# Patient Record
Sex: MALE | Race: WHITE | Employment: PART TIME | URBAN - METROPOLITAN AREA
[De-identification: names, ages, dates, MRNs, and addresses within clinical notes are randomized per-mention and may not be internally consistent; named-entity substitution may affect disease eponyms.]

---

## 2020-03-18 ENCOUNTER — APPOINTMENT (OUTPATIENT)
Dept: GENERAL RADIOLOGY | Age: 25
End: 2020-03-18
Payer: OTHER MISCELLANEOUS

## 2020-03-18 ENCOUNTER — APPOINTMENT (OUTPATIENT)
Dept: CT IMAGING | Age: 25
End: 2020-03-18
Payer: OTHER MISCELLANEOUS

## 2020-03-18 ENCOUNTER — HOSPITAL ENCOUNTER (EMERGENCY)
Age: 25
Discharge: HOME | End: 2020-03-18
Attending: EMERGENCY MEDICINE
Payer: OTHER MISCELLANEOUS

## 2020-03-18 VITALS
HEART RATE: 52 BPM | OXYGEN SATURATION: 99 % | SYSTOLIC BLOOD PRESSURE: 141 MMHG | RESPIRATION RATE: 16 BRPM | TEMPERATURE: 98.1 F | DIASTOLIC BLOOD PRESSURE: 70 MMHG

## 2020-03-18 LAB
ANION GAP SERPL CALCULATED.3IONS-SCNC: 10 MMOL/L (ref 3–16)
BASOPHILS ABSOLUTE: 0 K/UL (ref 0–0.2)
BASOPHILS RELATIVE PERCENT: 0.5 %
BUN BLDV-MCNC: 17 MG/DL (ref 7–20)
CALCIUM SERPL-MCNC: 9.5 MG/DL (ref 8.3–10.6)
CHLORIDE BLD-SCNC: 102 MMOL/L (ref 99–110)
CO2: 27 MMOL/L (ref 21–32)
CREAT SERPL-MCNC: 1 MG/DL (ref 0.9–1.3)
EOSINOPHILS ABSOLUTE: 0.1 K/UL (ref 0–0.6)
EOSINOPHILS RELATIVE PERCENT: 1.1 %
GFR AFRICAN AMERICAN: >60
GFR NON-AFRICAN AMERICAN: >60
GLUCOSE BLD-MCNC: 102 MG/DL (ref 70–99)
HCT VFR BLD CALC: 43 % (ref 40.5–52.5)
HEMOGLOBIN: 15.3 G/DL (ref 13.5–17.5)
INR BLD: 1.12 (ref 0.86–1.14)
LYMPHOCYTES ABSOLUTE: 1.4 K/UL (ref 1–5.1)
LYMPHOCYTES RELATIVE PERCENT: 29.4 %
MCH RBC QN AUTO: 31.2 PG (ref 26–34)
MCHC RBC AUTO-ENTMCNC: 35.5 G/DL (ref 31–36)
MCV RBC AUTO: 87.7 FL (ref 80–100)
MONOCYTES ABSOLUTE: 0.7 K/UL (ref 0–1.3)
MONOCYTES RELATIVE PERCENT: 14.2 %
NEUTROPHILS ABSOLUTE: 2.6 K/UL (ref 1.7–7.7)
NEUTROPHILS RELATIVE PERCENT: 54.8 %
PDW BLD-RTO: 12.6 % (ref 12.4–15.4)
PLATELET # BLD: 227 K/UL (ref 135–450)
PMV BLD AUTO: 7.5 FL (ref 5–10.5)
POTASSIUM REFLEX MAGNESIUM: 4 MMOL/L (ref 3.5–5.1)
PROTHROMBIN TIME: 13 SEC (ref 10–13.2)
RBC # BLD: 4.9 M/UL (ref 4.2–5.9)
SODIUM BLD-SCNC: 139 MMOL/L (ref 136–145)
WBC # BLD: 4.8 K/UL (ref 4–11)

## 2020-03-18 PROCEDURE — 6360000002 HC RX W HCPCS: Performed by: NURSE PRACTITIONER

## 2020-03-18 PROCEDURE — 73560 X-RAY EXAM OF KNEE 1 OR 2: CPT

## 2020-03-18 PROCEDURE — 99284 EMERGENCY DEPT VISIT MOD MDM: CPT

## 2020-03-18 PROCEDURE — 90715 TDAP VACCINE 7 YRS/> IM: CPT | Performed by: NURSE PRACTITIONER

## 2020-03-18 PROCEDURE — 85025 COMPLETE CBC W/AUTO DIFF WBC: CPT

## 2020-03-18 PROCEDURE — 70450 CT HEAD/BRAIN W/O DYE: CPT

## 2020-03-18 PROCEDURE — 80048 BASIC METABOLIC PNL TOTAL CA: CPT

## 2020-03-18 PROCEDURE — 2580000003 HC RX 258: Performed by: NURSE PRACTITIONER

## 2020-03-18 PROCEDURE — 6370000000 HC RX 637 (ALT 250 FOR IP): Performed by: NURSE PRACTITIONER

## 2020-03-18 PROCEDURE — 70498 CT ANGIOGRAPHY NECK: CPT

## 2020-03-18 PROCEDURE — 90471 IMMUNIZATION ADMIN: CPT | Performed by: NURSE PRACTITIONER

## 2020-03-18 PROCEDURE — 6360000004 HC RX CONTRAST MEDICATION: Performed by: EMERGENCY MEDICINE

## 2020-03-18 PROCEDURE — 96374 THER/PROPH/DIAG INJ IV PUSH: CPT

## 2020-03-18 PROCEDURE — 6360000002 HC RX W HCPCS: Performed by: EMERGENCY MEDICINE

## 2020-03-18 PROCEDURE — 85610 PROTHROMBIN TIME: CPT

## 2020-03-18 PROCEDURE — 71045 X-RAY EXAM CHEST 1 VIEW: CPT

## 2020-03-18 PROCEDURE — 73130 X-RAY EXAM OF HAND: CPT

## 2020-03-18 RX ORDER — NAPROXEN 500 MG/1
500 TABLET ORAL 2 TIMES DAILY
Qty: 20 TABLET | Refills: 0 | Status: SHIPPED | OUTPATIENT
Start: 2020-03-18 | End: 2021-11-17

## 2020-03-18 RX ORDER — CYCLOBENZAPRINE HCL 10 MG
10 TABLET ORAL ONCE
Status: COMPLETED | OUTPATIENT
Start: 2020-03-18 | End: 2020-03-18

## 2020-03-18 RX ORDER — CYCLOBENZAPRINE HCL 10 MG
10 TABLET ORAL 3 TIMES DAILY PRN
Qty: 21 TABLET | Refills: 0 | Status: SHIPPED | OUTPATIENT
Start: 2020-03-18 | End: 2020-03-28

## 2020-03-18 RX ORDER — KETOROLAC TROMETHAMINE 30 MG/ML
30 INJECTION, SOLUTION INTRAMUSCULAR; INTRAVENOUS
Status: COMPLETED | OUTPATIENT
Start: 2020-03-18 | End: 2020-03-18

## 2020-03-18 RX ORDER — 0.9 % SODIUM CHLORIDE 0.9 %
1000 INTRAVENOUS SOLUTION INTRAVENOUS ONCE
Status: COMPLETED | OUTPATIENT
Start: 2020-03-18 | End: 2020-03-18

## 2020-03-18 RX ORDER — NAPROXEN 250 MG/1
500 TABLET ORAL ONCE
Status: COMPLETED | OUTPATIENT
Start: 2020-03-18 | End: 2020-03-18

## 2020-03-18 RX ADMIN — KETOROLAC TROMETHAMINE 30 MG: 30 INJECTION, SOLUTION INTRAMUSCULAR at 14:22

## 2020-03-18 RX ADMIN — SODIUM CHLORIDE 1000 ML: 9 INJECTION, SOLUTION INTRAVENOUS at 14:25

## 2020-03-18 RX ADMIN — TETANUS TOXOID, REDUCED DIPHTHERIA TOXOID AND ACELLULAR PERTUSSIS VACCINE, ADSORBED 0.5 ML: 5; 2.5; 8; 8; 2.5 SUSPENSION INTRAMUSCULAR at 14:23

## 2020-03-18 RX ADMIN — CYCLOBENZAPRINE HYDROCHLORIDE 10 MG: 10 TABLET, FILM COATED ORAL at 14:25

## 2020-03-18 RX ADMIN — IOPAMIDOL 75 ML: 755 INJECTION, SOLUTION INTRAVENOUS at 15:34

## 2020-03-18 RX ADMIN — NAPROXEN 500 MG: 250 TABLET ORAL at 14:23

## 2020-03-18 ASSESSMENT — PAIN DESCRIPTION - ORIENTATION: ORIENTATION: RIGHT

## 2020-03-18 ASSESSMENT — PAIN DESCRIPTION - LOCATION: LOCATION: KNEE;SHOULDER

## 2020-03-18 ASSESSMENT — PAIN DESCRIPTION - PAIN TYPE: TYPE: ACUTE PAIN

## 2020-03-18 ASSESSMENT — PAIN SCALES - GENERAL
PAINLEVEL_OUTOF10: 8
PAINLEVEL_OUTOF10: 4
PAINLEVEL_OUTOF10: 7
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
## 2021-11-15 ENCOUNTER — OFFICE VISIT (OUTPATIENT)
Dept: INTERNAL MEDICINE CLINIC | Age: 26
End: 2021-11-15
Payer: COMMERCIAL

## 2021-11-15 VITALS
DIASTOLIC BLOOD PRESSURE: 84 MMHG | OXYGEN SATURATION: 96 % | HEART RATE: 92 BPM | BODY MASS INDEX: 24.46 KG/M2 | SYSTOLIC BLOOD PRESSURE: 134 MMHG | WEIGHT: 161.4 LBS | HEIGHT: 68 IN

## 2021-11-15 DIAGNOSIS — Z11.4 SCREENING FOR HIV (HUMAN IMMUNODEFICIENCY VIRUS): ICD-10-CM

## 2021-11-15 DIAGNOSIS — F17.200 VAPING NICOTINE DEPENDENCE, NON-TOBACCO PRODUCT: ICD-10-CM

## 2021-11-15 DIAGNOSIS — R22.1 LOCALIZED SWELLING, MASS AND LUMP, NECK: ICD-10-CM

## 2021-11-15 DIAGNOSIS — Z00.01 ENCOUNTER FOR WELL ADULT EXAM WITH ABNORMAL FINDINGS: Primary | ICD-10-CM

## 2021-11-15 DIAGNOSIS — M25.521 RIGHT ELBOW PAIN: ICD-10-CM

## 2021-11-15 DIAGNOSIS — Z11.59 NEED FOR HEPATITIS C SCREENING TEST: ICD-10-CM

## 2021-11-15 DIAGNOSIS — Z78.9 VARICELLA VACCINATION STATUS UNKNOWN: ICD-10-CM

## 2021-11-15 LAB
A/G RATIO: 2.2 (ref 1.1–2.2)
ALBUMIN SERPL-MCNC: 5 G/DL (ref 3.4–5)
ALP BLD-CCNC: 60 U/L (ref 40–129)
ALT SERPL-CCNC: 54 U/L (ref 10–40)
ANION GAP SERPL CALCULATED.3IONS-SCNC: 16 MMOL/L (ref 3–16)
AST SERPL-CCNC: 35 U/L (ref 15–37)
BASOPHILS ABSOLUTE: 0 K/UL (ref 0–0.2)
BASOPHILS RELATIVE PERCENT: 0.9 %
BILIRUB SERPL-MCNC: 0.8 MG/DL (ref 0–1)
BILIRUBIN DIRECT: <0.2 MG/DL (ref 0–0.3)
BILIRUBIN, INDIRECT: ABNORMAL MG/DL (ref 0–1)
BUN BLDV-MCNC: 23 MG/DL (ref 7–20)
CALCIUM SERPL-MCNC: 9.3 MG/DL (ref 8.3–10.6)
CHLORIDE BLD-SCNC: 104 MMOL/L (ref 99–110)
CHOLESTEROL, TOTAL: 152 MG/DL (ref 0–199)
CO2: 24 MMOL/L (ref 21–32)
CREAT SERPL-MCNC: 1.1 MG/DL (ref 0.9–1.3)
EOSINOPHILS ABSOLUTE: 0 K/UL (ref 0–0.6)
EOSINOPHILS RELATIVE PERCENT: 1.4 %
GFR AFRICAN AMERICAN: >60
GFR NON-AFRICAN AMERICAN: >60
GLUCOSE BLD-MCNC: 81 MG/DL (ref 70–99)
HCT VFR BLD CALC: 41.2 % (ref 40.5–52.5)
HDLC SERPL-MCNC: 72 MG/DL (ref 40–60)
HEMOGLOBIN: 14.2 G/DL (ref 13.5–17.5)
HEPATITIS C ANTIBODY INTERPRETATION: NORMAL
LDL CHOLESTEROL CALCULATED: 67 MG/DL
LYMPHOCYTES ABSOLUTE: 1.1 K/UL (ref 1–5.1)
LYMPHOCYTES RELATIVE PERCENT: 34.9 %
MCH RBC QN AUTO: 31.3 PG (ref 26–34)
MCHC RBC AUTO-ENTMCNC: 34.5 G/DL (ref 31–36)
MCV RBC AUTO: 90.6 FL (ref 80–100)
MONOCYTES ABSOLUTE: 0.5 K/UL (ref 0–1.3)
MONOCYTES RELATIVE PERCENT: 15.4 %
NEUTROPHILS ABSOLUTE: 1.5 K/UL (ref 1.7–7.7)
NEUTROPHILS RELATIVE PERCENT: 47.4 %
PDW BLD-RTO: 12.9 % (ref 12.4–15.4)
PLATELET # BLD: 237 K/UL (ref 135–450)
PMV BLD AUTO: 8.2 FL (ref 5–10.5)
POTASSIUM SERPL-SCNC: 4.2 MMOL/L (ref 3.5–5.1)
RBC # BLD: 4.55 M/UL (ref 4.2–5.9)
SODIUM BLD-SCNC: 144 MMOL/L (ref 136–145)
TOTAL PROTEIN: 7.3 G/DL (ref 6.4–8.2)
TRIGL SERPL-MCNC: 64 MG/DL (ref 0–150)
TSH REFLEX: 0.71 UIU/ML (ref 0.27–4.2)
VLDLC SERPL CALC-MCNC: 13 MG/DL
WBC # BLD: 3.1 K/UL (ref 4–11)

## 2021-11-15 PROCEDURE — G8427 DOCREV CUR MEDS BY ELIG CLIN: HCPCS | Performed by: INTERNAL MEDICINE

## 2021-11-15 PROCEDURE — 1036F TOBACCO NON-USER: CPT | Performed by: INTERNAL MEDICINE

## 2021-11-15 PROCEDURE — 99204 OFFICE O/P NEW MOD 45 MIN: CPT | Performed by: INTERNAL MEDICINE

## 2021-11-15 PROCEDURE — G8484 FLU IMMUNIZE NO ADMIN: HCPCS | Performed by: INTERNAL MEDICINE

## 2021-11-15 PROCEDURE — 99385 PREV VISIT NEW AGE 18-39: CPT | Performed by: INTERNAL MEDICINE

## 2021-11-15 PROCEDURE — 36415 COLL VENOUS BLD VENIPUNCTURE: CPT | Performed by: INTERNAL MEDICINE

## 2021-11-15 PROCEDURE — G8420 CALC BMI NORM PARAMETERS: HCPCS | Performed by: INTERNAL MEDICINE

## 2021-11-15 RX ORDER — NICOTINE 21 MG/24HR
1 PATCH, TRANSDERMAL 24 HOURS TRANSDERMAL DAILY
Qty: 14 PATCH | Refills: 2 | Status: SHIPPED | OUTPATIENT
Start: 2021-11-15 | End: 2022-07-12

## 2021-11-15 SDOH — ECONOMIC STABILITY: FOOD INSECURITY: WITHIN THE PAST 12 MONTHS, YOU WORRIED THAT YOUR FOOD WOULD RUN OUT BEFORE YOU GOT MONEY TO BUY MORE.: NEVER TRUE

## 2021-11-15 SDOH — ECONOMIC STABILITY: FOOD INSECURITY: WITHIN THE PAST 12 MONTHS, THE FOOD YOU BOUGHT JUST DIDN'T LAST AND YOU DIDN'T HAVE MONEY TO GET MORE.: NEVER TRUE

## 2021-11-15 ASSESSMENT — PATIENT HEALTH QUESTIONNAIRE - PHQ9
1. LITTLE INTEREST OR PLEASURE IN DOING THINGS: 0
SUM OF ALL RESPONSES TO PHQ9 QUESTIONS 1 & 2: 0
SUM OF ALL RESPONSES TO PHQ QUESTIONS 1-9: 0
SUM OF ALL RESPONSES TO PHQ QUESTIONS 1-9: 0
2. FEELING DOWN, DEPRESSED OR HOPELESS: 0
SUM OF ALL RESPONSES TO PHQ QUESTIONS 1-9: 0

## 2021-11-15 ASSESSMENT — ENCOUNTER SYMPTOMS
NAUSEA: 0
SORE THROAT: 0
VOMITING: 0
COUGH: 0
BLOOD IN STOOL: 0
ABDOMINAL PAIN: 0
SHORTNESS OF BREATH: 0

## 2021-11-15 ASSESSMENT — SOCIAL DETERMINANTS OF HEALTH (SDOH): HOW HARD IS IT FOR YOU TO PAY FOR THE VERY BASICS LIKE FOOD, HOUSING, MEDICAL CARE, AND HEATING?: NOT HARD AT ALL

## 2021-11-15 NOTE — PROGRESS NOTES
11/15/2021    Edgar Middleton (:  1995) is a 27 y.o. male, here for a preventive medicine evaluation. Patient Active Problem List   Diagnosis    Right elbow pain    Vaping nicotine dependence, non-tobacco product       Review of Systems   Constitutional: Negative for fatigue and fever. HENT: Negative for nosebleeds and sore throat. Cervical lymphadenopathy   Respiratory: Negative for cough and shortness of breath. Cardiovascular: Negative for chest pain, palpitations and leg swelling. Gastrointestinal: Negative for abdominal pain, blood in stool, nausea and vomiting. Musculoskeletal:        R elbow pain   Neurological: Negative for dizziness and weakness. Prior to Visit Medications    Medication Sig Taking? Authorizing Provider   naproxen (NAPROSYN) 500 MG tablet Take 1 tablet by mouth 2 times daily for 20 doses  Patient not taking: Reported on 11/15/2021  VONNIE Smith - CNP        No Known Allergies    History reviewed. No pertinent past medical history. History reviewed. No pertinent surgical history.     Social History     Socioeconomic History    Marital status: Single     Spouse name: Not on file    Number of children: Not on file    Years of education: Not on file    Highest education level: Not on file   Occupational History    Not on file   Tobacco Use    Smoking status: Never Smoker    Smokeless tobacco: Never Used   Vaping Use    Vaping Use: Every day    Substances: Always   Substance and Sexual Activity    Alcohol use: Yes     Comment: weekly     Drug use: No    Sexual activity: Not on file   Other Topics Concern    Not on file   Social History Narrative    Not on file     Social Determinants of Health     Financial Resource Strain: Low Risk     Difficulty of Paying Living Expenses: Not hard at all   Food Insecurity: No Food Insecurity    Worried About [address] in the Last Year: Never true    [address] in the Last Year: Never true   Transportation Needs:     Lack of Transportation (Medical): Not on file    Lack of Transportation (Non-Medical): Not on file   Physical Activity:     Days of Exercise per Week: Not on file    Minutes of Exercise per Session: Not on file   Stress:     Feeling of Stress : Not on file   Social Connections:     Frequency of Communication with Friends and Family: Not on file    Frequency of Social Gatherings with Friends and Family: Not on file    Attends Religion Services: Not on file    Active Member of [address] [place] [address] or Organizations: Not on file    Attends Club or Organization Meetings: Not on file    Marital Status: Not on file   Intimate Partner Violence:     Fear of Current or Ex-Partner: Not on file    Emotionally Abused: Not on file    Physically Abused: Not on file    Sexually Abused: Not on file   Housing Stability:     Unable to Pay for Housing in the Last Year: Not on file    Number of [place] in the Last Year: Not on file    Unstable Housing in the Last Year: Not on file        History reviewed. No pertinent family history. ADVANCE DIRECTIVE: N, <no information>    Vitals:    11/15/21 1103   BP: 134/84   Pulse: 92   SpO2: 96%   Weight: 161 lb 6.4 oz (73.2 kg)   Height: 5' 8\" (1.727 m)     Estimated body mass index is 24.54 kg/m² as calculated from the following:    Height as of this encounter: 5' 8\" (1.727 m). Weight as of this encounter: 161 lb 6.4 oz (73.2 kg). Physical Exam  Constitutional:       Appearance: Normal appearance. HENT:      Head: Normocephalic and atraumatic. Eyes:      General: No scleral icterus. Conjunctiva/sclera: Conjunctivae normal.   Cardiovascular:      Rate and Rhythm: Normal rate and regular rhythm. Pulses: Normal pulses. Heart sounds: Normal heart sounds. Pulmonary:      Effort: Pulmonary effort is normal.      Breath sounds: Normal breath sounds. Musculoskeletal:         General: No swelling.    Skin:     General: Skin is warm and dry. Neurological:      Mental Status: He is alert and oriented to person, place, and time. Mental status is at baseline. Psychiatric:         Mood and Affect: Mood normal.         Behavior: Behavior normal.         No flowsheet data found. Lab Results   Component Value Date    GLUCOSE 102 03/18/2020       The ASCVD Risk score (Usha Luong et al., 2013) failed to calculate for the following reasons: The 2013 ASCVD risk score is only valid for ages 36 to 78    Immunization History   Administered Date(s) Administered    Tdap (Boostrix, Adacel) 03/18/2020       Health Maintenance   Topic Date Due    Hepatitis C screen  Never done    Varicella vaccine (1 of 2 - 2-dose childhood series) Never done    HPV vaccine (1 - Male 2-dose series) Never done    COVID-19 Vaccine (1) Never done    HIV screen  Never done    Flu vaccine (1) Never done    DTaP/Tdap/Td vaccine (2 - Td or Tdap) 03/18/2030    Hepatitis A vaccine  Aged Out    Hepatitis B vaccine  Aged Out    Hib vaccine  Aged Out    Meningococcal (ACWY) vaccine  Aged Out    Pneumococcal 0-64 years Vaccine  Aged Out          ASSESSMENT/PLAN:  1. Encounter for well adult exam with abnormal findings  -     CBC Auto Differential; Future  -     Comprehensive Metabolic Panel; Future  -     Hepatic Function Panel; Future  -     Hemoglobin A1C; Future  -     Hepatitis C Antibody; Future  -     Lipid Panel; Future  -     TSH with Reflex; Future  -     HIV Screen; Future  2. Need for hepatitis C screening test  -     Hepatitis C Antibody; Future  3. Screening for HIV (human immunodeficiency virus)  -     HIV Screen; Future  4. Varicella vaccination status unknown  -     VARICELLA ZOSTER ANTIBODY, IGG; Future  5. Right elbow pain  6. Vaping nicotine dependence, non-tobacco product      Return in about 4 weeks (around 12/13/2021). An electronic signature was used to authenticate this note.     --Carmen Rogers MD on 11/15/2021 at 11:22 AM

## 2021-11-15 NOTE — PROGRESS NOTES
Laverne Marmolejo (:  1995) is a 27 y.o. male, New patient, here for evaluation of the following chief complaint(s):  Joint Pain (cryotherapy and iced. has started hurting after switching up workout ), Established New Doctor, and Annual Exam         ASSESSMENT/PLAN:  1. Right elbow pain  Probably medial epicondylitits  -     Caryle Saltness MD, Orthopedic Surgery, [facility]  2. Vaping nicotine dependence, non-tobacco product  -     nicotine (NICODERM CQ) 14 MG/24HR; Place 1 patch onto the skin daily for 14 days, Disp-14 patch, R-2Normal  -     nicotine polacrilex (NICORETTE) 2 MG gum; Take 1 each by mouth every 2 hours as needed for Smoking cessation, Disp-110 each, R-3Normal  3. Localized swelling, mass and lump, neck  -     CT SOFT TISSUE NECK W CONTRAST; Future      Return in about 4 weeks (around 2021). Subjective   SUBJECTIVE/OBJECTIVE:  Vaping:  Vapes 150 puffs/day for past 4 years  Willing to quit    Neck swelling:  Chronic, small lumps present over multiple sites in the neck. Non tender. Hand Pain   The incident occurred more than 1 week ago. The incident occurred at the gym. The injury mechanism was repetitive motion. The pain is present in the right elbow. The quality of the pain is described as aching. The pain does not radiate. The pain is at a severity of 6/10. The pain is moderate. The pain has been fluctuating since the incident. Pertinent negatives include no chest pain, muscle weakness, numbness or tingling. The symptoms are aggravated by movement. He has tried ice and rest for the symptoms. The treatment provided no relief. Review of Systems   Constitutional: Negative for fatigue and fever. HENT: Negative for nosebleeds and sore throat. Neck swelling   Respiratory: Negative for cough and shortness of breath. Cardiovascular: Negative for chest pain, palpitations and leg swelling.    Gastrointestinal: Negative for abdominal pain, blood in stool, nausea and vomiting. Musculoskeletal:        R elbow pian   Neurological: Negative for dizziness, tingling, weakness and numbness. Objective   Physical Exam  Constitutional:       Appearance: Normal appearance. HENT:      Head: Normocephalic and atraumatic. Eyes:      General: No scleral icterus. Conjunctiva/sclera: Conjunctivae normal.   Cardiovascular:      Rate and Rhythm: Normal rate and regular rhythm. Pulses: Normal pulses. Heart sounds: Normal heart sounds. Pulmonary:      Effort: Pulmonary effort is normal.      Breath sounds: Normal breath sounds. Musculoskeletal:         General: No swelling. Right elbow: No swelling, deformity, effusion or lacerations. Decreased range of motion. Tenderness present in medial epicondyle. Cervical back: Full passive range of motion without pain and normal range of motion. Lymphadenopathy:      Cervical: Cervical adenopathy present. Skin:     General: Skin is warm and dry. Neurological:      Mental Status: He is alert and oriented to person, place, and time. Mental status is at baseline. Psychiatric:         Mood and Affect: Mood normal.         Behavior: Behavior normal.              An electronic signature was used to authenticate this note.     --Juli Swan MD

## 2021-11-16 LAB
ESTIMATED AVERAGE GLUCOSE: 91.1 MG/DL
HBA1C MFR BLD: 4.8 %
HIV AG/AB: NORMAL
HIV ANTIGEN: NORMAL
HIV-1 ANTIBODY: NORMAL
HIV-2 AB: NORMAL
VARICELLA-ZOSTER VIRUS AB, IGG: NORMAL

## 2021-11-17 ENCOUNTER — OFFICE VISIT (OUTPATIENT)
Dept: ORTHOPEDIC SURGERY | Age: 26
End: 2021-11-17
Payer: COMMERCIAL

## 2021-11-17 VITALS — BODY MASS INDEX: 24.4 KG/M2 | HEIGHT: 68 IN | WEIGHT: 161 LBS

## 2021-11-17 DIAGNOSIS — M77.01 MEDIAL EPICONDYLITIS OF RIGHT ELBOW: ICD-10-CM

## 2021-11-17 DIAGNOSIS — M25.521 RIGHT ELBOW PAIN: Primary | ICD-10-CM

## 2021-11-17 PROCEDURE — G8427 DOCREV CUR MEDS BY ELIG CLIN: HCPCS | Performed by: FAMILY MEDICINE

## 2021-11-17 PROCEDURE — G8484 FLU IMMUNIZE NO ADMIN: HCPCS | Performed by: FAMILY MEDICINE

## 2021-11-17 PROCEDURE — G8420 CALC BMI NORM PARAMETERS: HCPCS | Performed by: FAMILY MEDICINE

## 2021-11-17 PROCEDURE — 99203 OFFICE O/P NEW LOW 30 MIN: CPT | Performed by: FAMILY MEDICINE

## 2021-11-17 PROCEDURE — MISCD86 TENNIS ELBOW STRAP-BREG: Performed by: FAMILY MEDICINE

## 2021-11-17 RX ORDER — MELOXICAM 15 MG/1
15 TABLET ORAL DAILY
Qty: 30 TABLET | Refills: 3 | Status: SHIPPED | OUTPATIENT
Start: 2021-11-17 | End: 2022-07-12

## 2021-11-17 RX ORDER — METHYLPREDNISOLONE 4 MG/1
TABLET ORAL
Qty: 21 KIT | Refills: 0 | Status: SHIPPED | OUTPATIENT
Start: 2021-11-17 | End: 2022-07-12 | Stop reason: ALTCHOICE

## 2021-11-17 RX ORDER — DEXAMETHASONE SODIUM PHOSPHATE 4 MG/ML
INJECTION, SOLUTION INTRA-ARTICULAR; INTRALESIONAL; INTRAMUSCULAR; INTRAVENOUS; SOFT TISSUE
Qty: 10 ML | Refills: 0 | Status: SHIPPED | OUTPATIENT
Start: 2021-11-17 | End: 2022-07-12

## 2021-11-17 NOTE — PATIENT INSTRUCTIONS
Take Medrol first for 6 days. This is a steroid pack. Flip the package over to the foil side and the directions will tell you to start with 6 pills the first day, 5 pills the second day, etc. Please do not take any other anti-inflammatories with the medrol dose juanita as this can upset your stomach. If something else is needed, you may take extra strength tylenol.      Once you are finished with the medrol, then you may start your anti-inflammatory: MELOXICAM 1X/DAY    DEXAMETHASONE - TAKE TO HAND THERAPY

## 2021-11-18 NOTE — PROGRESS NOTES
Chief Complaint    Elbow Pain (N RIGHT ELBOW)    Initial consultation right medial elbow pain with difficulty with gripping and grasping    History of Present Illness:  Anna Lehman is a 27 y.o. 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 Harpal Najera for evaluation of ongoing pain to the medial aspect of his right elbow. He states that he has been having ongoing pain symptoms since roughly July 2021 of pain to the medial aspect of his elbow. During his workouts he was incorporating more pull-ups and aggressive lifting of his upper extremities but there is no traumatic history of actual injury prior to becoming symptomatic and the onset of his symptoms were gradual.  He has no previous history of elbow pain and denies distal neuritic symptoms. He localizes the majority of his pain over the medial epicondyle which does worsen with resisted pronation and flexion. Gripping and grasping does produce pain in the range of 4-6 out of 10. He has tried icing and some low-dose anti-inflammatories without improvement of his symptoms and did see Dr. Cristino Bergman in the office on 11/15/2021 who referred him to us for orthopedic and sports consultation. He does live in [address] of the [place] location was more convenient for him. Denies locking or catching. He is not complaining of distal neuritic symptoms or night pain. He is being seen today for orthopedic and sports consultation with initial imaging. Medical History    Patient's medications, allergies, past medical, surgical, social and family histories were reviewed and updated as appropriate.       Review of Systems  Pertinent items are noted in HPI  Review of systems reviewed from Patient History Form dated on 11/17/2021 and available in the patient's chart under the Media tab. Vital Signs     Ht 5' 8\" (1.727 m)   Wt 161 lb (73 kg)   BMI 24.48 kg/m²     General Exam:     Constitutional: Patient is adequately groomed with no evidence of malnutrition  DTRs: Deep tendon reflexes are intact  Mental Status: The patient is oriented to time, place and person. The patient's mood and affect are appropriate. Lymphatic: The lymphatic examination bilaterally reveals all areas to be without enlargement or induration. Vascular: Examination reveals no swelling or calf tenderness. Peripheral pulses are palpable and 2+. Neurological: The patient has good coordination. There is no weakness or sensory deficit. Elbow Examination    Inspection: There is no high-grade deformity or substantial soft tissue swelling. No evidence of ecchymosis. Palpation: He does have clinical tenderness at 6-7 out of 10 with palpation of the medial epicondyle and proximal aspect of the common flexor tendon. No lateral radial head tenderness. Rang of Motion: He does have full active and passive range of motion about the elbow with some medial level pain with extreme supination. Strength: Pain associated weakness 4 out of 5 resisted wrist flexion and pronation. Special Tests: No evidence of elbow instability. Negative Tinel's cubital and carpal tunnel. Skin: There are no rashes, ulcerations or lesions. Distal motor sensory and vascular exam is intact. Gait: Fluid smooth gait       Reflex symmetrically preserved      Additional Comments:             Additional Examinations:     Contralateral Exam: Examination of the left elbow reveals warm, dry skin. Range of motion of the elbow and forearm is within normal limits. There is no tenderness of the medial or lateral epicondyles, olecranon, or radial head. Elbow and forearm strength is 5/5.       Right Upper Extremity:  Examination of the right upper extremity does not show any tenderness, deformity or injury. Range of motion is unremarkable. There is no gross instability. There are no rashes, ulcerations or lesions. Strength and tone are normal.  Left Upper Extremity: Examination of the left upper extremity does not show any tenderness, deformity or injury. Range of motion is unremarkable. There is no gross instability. There are no rashes, ulcerations or lesions. Strength and tone are normal.        Diagnostic Test Findings:   Right elbow AP lateral radial head films were obtained today does not show any evidence of osseous injury or degenerative changes. No medial epicondylar calcification noted. Assessment: #1.  4-month status post unrehabilitated suspected right medial epicondylitis    Impression:     Encounter Diagnoses   Name Primary?  Right elbow pain Yes    Medial epicondylitis of right elbow        Office Procedures:     Orders Placed This Encounter   Procedures    Breg Tennis Elbow Strap $20     Patient was supplied a Breg Tennis Elbow Strap. This retail item was supplied to provide functional support and assist in protecting the affected area. Verbal and written instructions for the use of and application of this item were provided. The patient was educated and fit by a healthcare professional with expert knowledge and specialization in brace application. They were instructed to contact the office immediately should the equipment result in increased pain, decreased sensation, increased swelling or worsening of the condition.     XR ELBOW RIGHT (MIN 3 VIEWS)     Standing Status:   Future     Number of Occurrences:   1     Standing Expiration Date:   11/17/2022     Order Specific Question:   Reason for exam:     Answer:   pain    Ambulatory referral to Occupational Therapy     Referral Priority:   Routine     Referral Type:   Eval and Treat     Referral Reason:   Specialty Services Required     Number of Visits Requested:   1       Treatment Plan: Treatment options were discussed withEdgar Lei. We did review his plain films and exam findings. He has been having ongoing symptoms for the past 4 months most consistent with unrehabilitated right elbow medial epicondylitis. We discussed various treatment options and ultimately opted to place him on a Medrol Dosepak to be followed by meloxicam 15 mg daily. He was placed in an elbow counterforce brace and will start him in a short course of hand therapy to include manual techniques including iontophoresis. Icing and activity modification modification of his workouts was discussed based on pain. We will see him back in a few weeks for follow-up. May consider epicondylar injection under imaging depending on his response to treatment. He will contact us in the interim with questions or concerns. CC: Dr. Lizabeth Pichardo    This dictation was performed with a verbal recognition program [facility] ) and it was checked for errors. It is possible that there are still dictated errors within this office note. If so, please bring any errors to my attention for an addendum. All efforts were made to ensure that this office note is accurate.

## 2021-11-22 ENCOUNTER — HOSPITAL ENCOUNTER (OUTPATIENT)
Dept: OCCUPATIONAL THERAPY | Age: 26
Setting detail: THERAPIES SERIES
Discharge: HOME | End: 2021-11-22

## 2021-12-15 ENCOUNTER — CLINICAL DOCUMENTATION (OUTPATIENT)
Dept: OTHER | Age: 26
End: 2021-12-15

## 2021-12-16 ENCOUNTER — TELEPHONE (OUTPATIENT)
Dept: ORTHOPEDIC SURGERY | Age: 26
End: 2021-12-16

## 2021-12-16 NOTE — TELEPHONE ENCOUNTER
General Question     Subject: WOULD LIKE A DIFFERENT ALTERNATIVE TO THE TOPICAL CREAM. PLEASE ADVISE.    PatienT: Paramjit Newby  Contact Number: [phone]

## 2021-12-16 NOTE — TELEPHONE ENCOUNTER
CLARIFIED WITH PATIENT ABOUT DEXAMETHASONE AND THAT ITS OK TO HOLD OFF (ON BACK ORDER AT PHARMACY), THAT SCHEDULING FOR THERAPY IS MOST IMPORTANT AT THIS POINT TO TREAT HIS ELBOW. PATIENT IS GOING TO CALL AND SCHEDULE.

## 2022-01-03 ENCOUNTER — OFFICE VISIT (OUTPATIENT)
Dept: ORTHOPEDIC SURGERY | Age: 27
End: 2022-01-03
Payer: COMMERCIAL

## 2022-01-03 DIAGNOSIS — M77.01 MEDIAL EPICONDYLITIS OF RIGHT ELBOW: ICD-10-CM

## 2022-01-03 DIAGNOSIS — S76.011A STRAIN OF FLEXOR MUSCLE OF RIGHT HIP, INITIAL ENCOUNTER: ICD-10-CM

## 2022-01-03 DIAGNOSIS — M25.551 RIGHT HIP PAIN: Primary | ICD-10-CM

## 2022-01-03 DIAGNOSIS — M25.521 RIGHT ELBOW PAIN: ICD-10-CM

## 2022-01-03 PROCEDURE — G8428 CUR MEDS NOT DOCUMENT: HCPCS | Performed by: FAMILY MEDICINE

## 2022-01-03 PROCEDURE — G8420 CALC BMI NORM PARAMETERS: HCPCS | Performed by: FAMILY MEDICINE

## 2022-01-03 PROCEDURE — G8484 FLU IMMUNIZE NO ADMIN: HCPCS | Performed by: FAMILY MEDICINE

## 2022-01-03 PROCEDURE — 99214 OFFICE O/P EST MOD 30 MIN: CPT | Performed by: FAMILY MEDICINE

## 2022-01-03 PROCEDURE — 1036F TOBACCO NON-USER: CPT | Performed by: FAMILY MEDICINE

## 2022-01-03 NOTE — PROGRESS NOTES
Chief Complaint    Elbow Pain (1 MO FU GOLFERS ELBOW) and Hip Pain (N R HIP)      History of Present Illness:  Марина Rebolledo is a 27 y.o. male presents to the office to follow-up on his right golfers elbow with new complaints of right hip flexor pain. Patient stated that his elbow has been feeling much better with the brace and with the medications. He completed a series of medrol dosepack and been taking meloxicam 15mg daily. Patient stated that his elbow pain and strength have improved about 95%. Patient denies any pain on his elbow today or weakness. Patient stated that in the last 2-3 weeks, he has started [address] [employer] training and has changed his exercise routine at the gym. He has been doing more exercises with hip abduction exercises. Patient stated that he has been experiencing mild hip flexor pain with abduction. The pain is in his groin area. He described the pain as tightness and sharp. Patient said that he feels like he needs stretching. He has been taking meloxicam which has been helping with the pain. Aggravating factors are hip abduction. Patient rated the pain as 1-2/10. Patient denies any injuries or trauma. Medical History  Patient's medications, allergies, past medical, surgical, social and family histories were reviewed and updated as appropriate. Review of Systems  Pertinent items are noted in HPI  Review of systems reviewed from Patient History Form dated on 1/3/2022   and available in the patient's chart under the Media tab. Vital Signs  There were no vitals filed for this visit. General Exam:     Constitutional: Patient is adequately groomed with no evidence of malnutrition  DTRs: Deep tendon reflexes are intact  Mental Status: The patient is oriented to time, place and person. The patient's mood and affect are appropriate. Lymphatic: The lymphatic examination bilaterally reveals all areas to be without enlargement or induration.   Vascular: Examination reveals no swelling or calf tenderness. Peripheral pulses are palpable and 2+. Neurological: The patient has good coordination. There is no weakness or sensory deficit. Hip Examination    Inspection:  No redness, rashes, bruises or lesions. Palpation:  nontender to touch    Rang of Motion: active and passive range of motion intact. Strength: 5 /5 in bilateral lower extremities with the exception of 4+ out of 5 strength with hip flexion on the right. Alessandra Lever Special Tests:   Negative log roll negative bilaterally. Some pain with passive extension of the right hip. Some tightness to his hamstrings and IT bands. No evidence of hip instability. Negative screening cervical testing. Skin: There are no rashes, ulcerations or lesions. Gait: normal    Reflex symmetrically preserved    Additional Comments: Right elbow examination reveals much less tenderness over the medial epicondyle full active and passive range of motion. He is no longer demonstrating substantial pain with resisted wrist flexion and forearm pronation. No evidence of elbow instability. No swelling or effusion. Additional Examinations:  Right Lower Extremity: Examination of the right lower extremity does not show any tenderness, deformity or injury. Range of motion is unremarkable. There is no gross instability. There are no rashes, ulcerations or lesions. Strength and tone are normal.  Left Lower Extremity: Examination of the left lower extremity does not show any tenderness, deformity or injury. Range of motion is unremarkable. There is no gross instability. There are no rashes, ulcerations or lesions. Strength and tone are normal.  Lower Back: Examination of the lower back does not show any tenderness, deformity or injury. Range of motion is unremarkable. There is no gross instability. There are no rashes, ulcerations or lesions.   Strength and tone are normal.      Diagnostic Test Findings:    XRAY right hip AP and frog films obtained 1/3/2021: unremarkable,  No signs or evidence of arthritis       Assessment :    Chart/records reviewed, history taken, physical exam performed, presenting problems addressed. Hip pain is most likely due to strain of flexor muscle. Impression:  Encounter Diagnoses   Name Primary?  Right hip pain Yes    Strain of flexor muscle of right hip, initial encounter        Office Procedures:  Orders Placed This Encounter   Procedures    XR HIP RIGHT (2-3 VIEWS)     Standing Status:   Future     Number of Occurrences:   1     Standing Expiration Date:   1/3/2023     Order Specific Question:   Reason for exam:     Answer:   pain    Ambulatory referral to Physical Therapy     Referral Priority:   Routine     Referral Type:   Eval and Treat     Referral Reason:   Specialty Services Required     Requested Specialty:   Physical Therapy     Number of Visits Requested:   1       Treatment Plan: Treatment options were discussed with Mady Muñoz today. We did review his updated hip plain films and exam findings. He is doing much better with regard to his medial epicondylitis rating his improvement 95+ percent. He may continue with his home-based exercises and use of his counterforce strap. He also does appear to have some inflexibility and likely straining issues with his hip flexor on the right. Discussed treatment plan with patient. Referred patient to physical therapy to strengthen hip flexors muscles and improve mobility. Recommended 1-2 sessions per week for 4 weeks. Advised patient to share his workout training exercises with physical therapy for adequate adjustments. Recommended patient stretching exercises after strengthening exercises. I would like for the therapist to evaluate his workout program emphasizing more stretching. Recommended patient to continue meloxicam 15mg daily for 2 more weeks then to take as needed.  The patient was advised that NSAID-type medications have two very important potential side effects: gastrointestinal irritation including hemorrhage and renal injuries. He was asked to take the medication with food and to stop if he experiences any GI upset. I asked him to call for vomiting, abdominal pain or black/bloody stools. The patient expresses understanding of these issues and questions were answered. We will see him back in about 3 to 4 weeks to recheck his right hip. Icing and activity modification was discussed. May consider imaging if he is failing to improve. He will contact us in the interim with questions or concerns. This dictation was performed with a verbal recognition program (DRAGON) and it was checked for errors. It is possible that there are still dictated errors within this office note. If so, please bring any errors to my attention for an addendum. All efforts were made to ensure that this office note is accurate.       Ted [place]  1/3/2022

## 2022-01-14 ENCOUNTER — TELEPHONE (OUTPATIENT)
Dept: ORTHOPEDIC SURGERY | Age: 27
End: 2022-01-14
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
## 2022-05-18 ENCOUNTER — OFFICE VISIT (OUTPATIENT)
Dept: ORTHOPEDIC SURGERY | Age: 27
End: 2022-05-18
Payer: COMMERCIAL

## 2022-05-18 VITALS — BODY MASS INDEX: 24.4 KG/M2 | HEIGHT: 68 IN | WEIGHT: 161 LBS

## 2022-05-18 DIAGNOSIS — M25.561 RIGHT KNEE PAIN, UNSPECIFIED CHRONICITY: Primary | ICD-10-CM

## 2022-05-18 DIAGNOSIS — S86.892A SHIN SPLINT, LEFT, INITIAL ENCOUNTER: ICD-10-CM

## 2022-05-18 DIAGNOSIS — M22.2X1 PATELLOFEMORAL PAIN SYNDROME OF RIGHT KNEE: ICD-10-CM

## 2022-05-18 DIAGNOSIS — S83.001A SUBLUXATION OF RIGHT PATELLA, INITIAL ENCOUNTER: ICD-10-CM

## 2022-05-18 DIAGNOSIS — S86.891A RIGHT MEDIAL TIBIAL STRESS SYNDROME, INITIAL ENCOUNTER: ICD-10-CM

## 2022-05-18 PROCEDURE — 1036F TOBACCO NON-USER: CPT | Performed by: FAMILY MEDICINE

## 2022-05-18 PROCEDURE — 20610 DRAIN/INJ JOINT/BURSA W/O US: CPT | Performed by: FAMILY MEDICINE

## 2022-05-18 PROCEDURE — L3040 FT ARCH SUPRT PREMOLD LONGIT: HCPCS | Performed by: FAMILY MEDICINE

## 2022-05-18 PROCEDURE — 99214 OFFICE O/P EST MOD 30 MIN: CPT | Performed by: FAMILY MEDICINE

## 2022-05-18 PROCEDURE — G8427 DOCREV CUR MEDS BY ELIG CLIN: HCPCS | Performed by: FAMILY MEDICINE

## 2022-05-18 PROCEDURE — MISCD110 LATERAL STABILIZER: Performed by: FAMILY MEDICINE

## 2022-05-18 PROCEDURE — G8420 CALC BMI NORM PARAMETERS: HCPCS | Performed by: FAMILY MEDICINE

## 2022-05-18 RX ORDER — BUPIVACAINE HYDROCHLORIDE 2.5 MG/ML
2 INJECTION, SOLUTION INFILTRATION; PERINEURAL ONCE
Status: COMPLETED | OUTPATIENT
Start: 2022-05-18 | End: 2022-05-18

## 2022-05-18 RX ORDER — LIDOCAINE HYDROCHLORIDE 10 MG/ML
1 INJECTION, SOLUTION INFILTRATION; PERINEURAL ONCE
Status: COMPLETED | OUTPATIENT
Start: 2022-05-18 | End: 2022-05-18

## 2022-05-18 RX ORDER — BETAMETHASONE SODIUM PHOSPHATE AND BETAMETHASONE ACETATE 3; 3 MG/ML; MG/ML
12 INJECTION, SUSPENSION INTRA-ARTICULAR; INTRALESIONAL; INTRAMUSCULAR; SOFT TISSUE ONCE
Status: COMPLETED | OUTPATIENT
Start: 2022-05-18 | End: 2022-05-18

## 2022-05-18 RX ORDER — MELOXICAM 15 MG/1
15 TABLET ORAL DAILY
Qty: 30 TABLET | Refills: 3 | Status: SHIPPED | OUTPATIENT
Start: 2022-05-18 | End: 2022-07-12

## 2022-05-18 RX ADMIN — BUPIVACAINE HYDROCHLORIDE 5 MG: 2.5 INJECTION, SOLUTION INFILTRATION; PERINEURAL at 10:24

## 2022-05-18 RX ADMIN — BETAMETHASONE SODIUM PHOSPHATE AND BETAMETHASONE ACETATE 12 MG: 3; 3 INJECTION, SUSPENSION INTRA-ARTICULAR; INTRALESIONAL; INTRAMUSCULAR; SOFT TISSUE at 10:23

## 2022-05-18 RX ADMIN — LIDOCAINE HYDROCHLORIDE 1 ML: 10 INJECTION, SOLUTION INFILTRATION; PERINEURAL at 10:25

## 2022-05-18 NOTE — PROGRESS NOTES
Chief Complaint  Knee Pain (R knee )      Initial consultation ongoing knee pain with subtle buckling status post twisting injury    History of Present Illness:  Shravan Dickson is a 27 y.o. 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 He states that on roughly 5/8/2022 he was doing a 48 inch box jumps working out which he did complete them as he was getting down from the box he may have sustained a slight twisting injury to his right knee with only mild pain initially. Shortly thereafter he began to notice increasing pain and swelling to his knee with episodes of pseudo buckling. There is only mild swelling. He is not really complaining of locking or catching and initially treated himself with icing and ibuprofen but his symptoms have only improved about 50% he is having to limit his workout program is having difficulty with prolonged walking as well as positional changes feeling as if his knee is going to give out. He has no previous history of knee injury. He has been taking subtherapeutic dosings of ibuprofen and denies active locking or catching. He is being seen today for orthopedic and sports consultation with additional imaging.       Pain Assessment  Location of Pain: Knee  Location Modifiers: Right  Severity of Pain: 5  Quality of Pain: Sharp  Duration of Pain: Persistent  Frequency of Pain: Intermittent  Aggravating Factors: Walking,Squatting,Kneeling,Exercise,Bending  Limiting Behavior: Some  Relieving Factors: Nsaids,Ice  Result of Injury: Yes  Work-Related Injury: No  Are there other pain locations you wish to document?: No         Medical History     Patient's medications, allergies, past medical, surgical, social and family histories were reviewed and updated as appropriate. Review of Systems  Pertinent items are noted in HPI  Review of systems reviewed from Patient History Form dated on 5/18/2022 and available in the patient's chart under the Media tab. Vital Signs  There were no vitals filed for this visit. General Exam:     Constitutional: Patient is adequately groomed with no evidence of malnutrition  DTRs: Deep tendon reflexes are intact  Mental Status: The patient is oriented to time, place and person. The patient's mood and affect are appropriate. Lymphatic: The lymphatic examination bilaterally reveals all areas to be without enlargement or induration. Vascular: Examination reveals no swelling or calf tenderness. Peripheral pulses are palpable and 2+. Neurological: The patient has good coordination. There is no weakness or sensory deficit. Knee Examination  Inspection: There is no high-grade deformity or substantial soft tissue swelling. No substantial effusion. Palpation: He does have tight most of his tenderness over the medial greater than lateral patellofemoral facet and does have it reproduced with patellar grind testing. No substantial joint line tenderness infrapatellar tendon or quad tenderness. Rang of Motion: He is stiff in terminal 5 degrees of extension with flexion to about 120. Hamstrings somewhat tight. Strength: 4+ out of 5 with knee flexion extension. Special Tests: The vast majority of his pain is reproduced with patellar grind testing. I would cause apprehension test negative with negative Bianca's or obvious signs of instability. He is in overpronated. Skin: There are no rashes, ulcerations or lesions. Distal neurovascular exam is intact. Gait: Reasonable gait with overpronation. Reflex symmetrically preserved    Additional Comments:     Additional Examinations:  Contralateral Exam: Examination of the left knee reveals intact skin.   There is no focal tenderness. The patient demonstrates full painless range of motion with regards to flexion and extension. Strength is 5/5 thorough out all planes. Ligamentous stability is grossly intact. Right Lower Extremity: Examination of the right lower extremity does not show any tenderness outside of some mild tenderness to the posterior medial distal tibia consistent with shinsplints, deformity or injury. Range of motion is unremarkable. There is no gross instability. There are no rashes, ulcerations or lesions. Strength and tone are normal.  Left Lower Extremity: Examination of the left lower extremity does not show any tenderness outside of some mild tenderness to the posterior medial distal tibia consistent with shinsplints, deformity or injury. Range of motion is unremarkable. There is no gross instability. There are no rashes, ulcerations or lesions. Strength and tone are normal.      Diagnostic Test Findings: Right knee AP lateral and sunrise films were obtained today and does show moderate tilt on sunrise view. Assessment : #1.  10 days status post twisting injury right knee with likely low-grade partially improved patellar subluxation with suspected maltracking knee pain and synovitis with overpronation and history of shinsplints    Impression:  Encounter Diagnoses   Name Primary?  Right knee pain, unspecified chronicity Yes    Subluxation of right patella, initial encounter     Patellofemoral pain syndrome of right knee     Right medial tibial stress syndrome, initial encounter     Shin splint, left, initial encounter        Office Procedures:  Orders Placed This Encounter   Procedures    Breg Lateral Stabilizer Knee Brace $60     Patient was supplied a Breg Lateral Stabilizer. This retail item was supplied to provide functional support and assist in protecting the affected area. Verbal and written instructions for the use of and application of this item were provided.   The patient was educated and fit by a healthcare professional with expert knowledge and specialization in brace application. They were instructed to contact the office immediately should the equipment result in increased pain, decreased sensation, increased swelling or worsening of the condition.  XR KNEE RIGHT (MIN 4 VIEWS)     Standing Status:   Future     Number of Occurrences:   1     Standing Expiration Date:   5/18/2023    TN ARTHROCENTESIS ASPIR&/INJ MAJOR JT/BURSA W/O US    Powerstep Protech Full Length Insert     Patient was prescribed Powerstep Protech Full Length Inserts. The bilateral FEET will require stabilization / support from this semi-rigid / rigid orthosis to improve their function. The orthosis will assist in protecting the affected area, provide functional support and facilitate healing. The patient was educated and fit by a healthcare professional with expert knowledge and specialization in brace application while under the direct supervision of the treating physician. Verbal and written instructions for the use of and application of this item were provided. They were instructed to contact the office immediately should the brace result in increased pain, decreased sensation, increased swelling or worsening of the condition. Treatment Plan:  Treatment options were discussed with Ray Cosme. We did review his plain films and exam findings. He is now about 10 days out from a twisting injury to his knee and I suspect hurdling with a low-grade patellar subluxation with patellofemoral maltracking. He is also quite tight and does have a history of shinsplints. He has improved about 50% but has not been working out as he would like. We did place him in a patellar stabilizing brace to control his subtle buckling given power step inserts. When instruct him on a proper patella protection program and did start him on meloxicam 15 mg daily once again for the next several weeks.   After also discussing options, we injected his right knee today using 2 cc of Celestone, 2 cc of Marcaine, 1 cc of Xylocaine. We will see him back in 3 to 4 weeks and consider imaging if he is failing to improve versus formal therapy. He will contact us in the interim with questions or concerns. This dictation was performed with a verbal recognition program (DRAGON) and it was checked for errors. It is possible that there are still dictated errors within this office note. If so, please bring any errors to my attention for an addendum. All efforts were made to ensure that this office note is accurate.

## 2022-05-18 NOTE — LETTER
[address] [place] [address] [address] [place] [address]  Phone: [phone]  Fax: Kyler HinesMitchellHarinder Mcclure [address] MD        May 18, 2022     Patient: Abraham Glynn   YOB: 1995   Date of Visit: 5/18/2022       To Whom It May Concern: It is my medical opinion that Calvin Hickey was seen in the office and needs to be excused from his work shifts last Thursday and Friday 5/12, 5/13 due to an orthopedic condition. Please excuse him for leaving early on Saturday due to this same orthopedic condition. For future shifts, please allow patient to wear knee brace while at work. If you have any questions or concerns, please don't hesitate to call.     Sincerely,        Amelia Germain MD

## 2022-05-18 NOTE — Clinical Note
[address] [place] [address] Dr [place] [place] [address]  Phone: [phone]  Fax: Kyler Mcclure [phone] Jacqueline Sharp MD        May 18, 2022     Patient: Liu Doss   YOB: 1995   Date of Visit: 5/18/2022       To Whom It May Concern: It is my medical opinion that Romero Mac {Work release (duty restriction):85798}. If you have any questions or concerns, please don't hesitate to call.     Sincerely,        Jung Richmond MD

## 2022-05-19 ENCOUNTER — TELEPHONE (OUTPATIENT)
Dept: ORTHOPEDIC SURGERY | Age: 27
End: 2022-05-19

## 2022-05-19 NOTE — LETTER
[facility]  [facility] [address] [address]  Phone: [phone]  Fax: [phone]    Mario Laird MD        May 19, 2022     Patient: Sarah Potts   YOB: 1995   Date of Visit: 5/18/2022       To Whom It May Concern: It is my medical opinion that Leonard Frank should remain out of work for an additional today (5/19/2022) after his appt on 5/18. He should stay out of work until 5/20/22 for his next scheduled shift. Please allow him to wear his knee brace when he returns to work. If you have any questions or concerns, please don't hesitate to call.     Sincerely,        Mario Laird MD

## 2022-05-19 NOTE — TELEPHONE ENCOUNTER
General Question     Subject: PATIENT WOULD LIKE TO KNOW IF HE MIGHT BE ABLE TO  A NOTE FOR WORK FOR MAY 19, 2022. HE WAS NOT ABLE TO GO TO WORK TODAY AS HIS KNEE WAS SORE FROM HIS APPOINTMENT ON MAY 18, 2022. HE WOULD LIKE TO  THE NOTE IN THE [facility] OFFICE THIS AFTERNOON. PLEASE ADVISE.     Patient:  Jose Hernandes  Contact Number: [phone]

## 2022-05-19 NOTE — TELEPHONE ENCOUNTER
SPOKE TO PATIENT, TOLD HIM WORK NOTE WOULD BE AVAILABLE FOR HIM TO  AT THE DESK IN [facility].

## 2022-07-05 ENCOUNTER — APPOINTMENT (OUTPATIENT)
Dept: GENERAL RADIOLOGY | Age: 27
End: 2022-07-05
Payer: COMMERCIAL

## 2022-07-05 ENCOUNTER — HOSPITAL ENCOUNTER (EMERGENCY)
Age: 27
Discharge: HOME | End: 2022-07-05
Payer: COMMERCIAL

## 2022-07-05 VITALS
OXYGEN SATURATION: 100 % | SYSTOLIC BLOOD PRESSURE: 129 MMHG | TEMPERATURE: 98 F | WEIGHT: 165 LBS | HEART RATE: 80 BPM | DIASTOLIC BLOOD PRESSURE: 75 MMHG | RESPIRATION RATE: 16 BRPM

## 2022-07-05 DIAGNOSIS — S93.105A TOE DISLOCATION, LEFT, INITIAL ENCOUNTER: Primary | ICD-10-CM

## 2022-07-05 PROCEDURE — 99283 EMERGENCY DEPT VISIT LOW MDM: CPT

## 2022-07-05 PROCEDURE — 73660 X-RAY EXAM OF TOE(S): CPT

## 2022-07-05 PROCEDURE — 28660 TREAT TOE DISLOCATION: CPT

## 2022-07-05 PROCEDURE — 73630 X-RAY EXAM OF FOOT: CPT

## 2022-07-06 NOTE — ED NOTES
Patient left via personal vehicle to private residence in stable condition. Patients vitals were assessed before discharge and were stable. Patient verbalized understanding of discharge instructions, follow up and medications. RN explained information in discharge packet and patient verbalized they have no questions at this time. Patient left ER with all paperwork and belongings that RN is aware of.         Sidney Hurley RN  07/05/22 [id number]

## 2022-07-06 NOTE — ED PROVIDER NOTES
[facility] Emergency Department    CHIEF COMPLAINT  Foot Injury (was doing box jumps at the gym and hurt left great toe)      SHARED SERVICE VISIT:  Evaluated by BARBIE. My supervising physician was available for consultation. HISTORY OF PRESENT ILLNESS  Kaila Ramirez is a 27 y.o. male who presents to the ED complaining of 1 hour history of left great toe pain. Patient drove himself in for evaluation. States that he was doing box jumps at the gym. States that he jumped up on the box which napoleon backwards and caused him to fall. States that he landed awkwardly on the foot. Felt sharp pop in the great toe and has had pain since. Limited range of motion. He denies numbness, tingling, weakness of the extremity. Sustained no other injuries. Denies prior history of injury or trauma to the area. No other complaints, modifying factors or associated symptoms. Nursing notes reviewed. No past medical history on file. No past surgical history on file. No family history on file. Social History     Socioeconomic History    Marital status: Single     Spouse name: Not on file    Number of children: Not on file    Years of education: Not on file    Highest education level: Not on file   Occupational History    Not on file   Tobacco Use    Smoking status: Current Every Day Smoker     Types: E-Cigarettes    Smokeless tobacco: Never Used   Substance and Sexual Activity    Alcohol use: Not Currently    Drug use: Not Currently    Sexual activity: Not on file   Other Topics Concern    Not on file   Social History Narrative    Not on file     Social Determinants of Health     Financial Resource Strain:     Difficulty of Paying Living Expenses: Not on file   Food Insecurity:     Worried About Running Out of Food in the Last Year: Not on file    George of Food in the Last Year: Not on file   Transportation Needs:     Lack of Transportation (Medical):  Not on file    Lack of Transportation (Non-Medical): Not on file   Physical Activity:     Days of Exercise per Week: Not on file    Minutes of Exercise per Session: Not on file   Stress:     Feeling of Stress : Not on file   Social Connections:     Frequency of Communication with Friends and Family: Not on file    Frequency of Social Gatherings with Friends and Family: Not on file    Attends Anabaptist Services: Not on file    Active Member of [address] [place] [address] [employer] or Organizations: Not on file    Attends Club or Organization Meetings: Not on file    Marital Status: Not on file   Intimate Partner Violence:     Fear of Current or Ex-Partner: Not on file    Emotionally Abused: Not on file    Physically Abused: Not on file    Sexually Abused: Not on file   Housing Stability:     Unable to Pay for Housing in the Last Year: Not on file    Number of [place] in the Last Year: Not on file    Unstable Housing in the Last Year: Not on file     No current facility-administered medications for this encounter. No current outpatient medications on file. No Known Allergies    REVIEW OF SYSTEMS  6 systems reviewed, pertinent positives per HPI otherwise noted to be negative    PHYSICAL EXAM  /75   Pulse 80   Temp 98 °F (36.7 °C) (Oral)   Resp 16   Wt 165 lb (74.8 kg)   SpO2 100%   GENERAL APPEARANCE: Awake and alert. Cooperative. No acute distress. HEAD: Normocephalic. Atraumatic. EYES: PERRL. EOM's grossly intact. ENT: Mucous membranes are moist.   NECK: Supple. Normal ROM. CHEST: Equal symmetric chest rise. LUNGS: Breathing is unlabored. Speaking comfortably in full sentences. Abdomen: Nondistended  EXTREMITIES: MAEE. No acute deformities. On exam of the left great toe patient with toe in what appears to be's hyper extension. Global tenderness. Limited range of motion and patient states that he is unable to bend. No obvious deformity or step-off. Compartments are soft without crepitus. Sensation intact.   Pedal pulses +2 and cap refill less than 5 seconds. SKIN: Warm and dry. NEUROLOGICAL: Alert and oriented. Strength is 5/5 in all extremities and sensation is intact. RADIOLOGY  XR FOOT LEFT (MIN 3 VIEWS)    Result Date: 7/5/2022  EXAMINATION: THREE XRAY VIEWS OF THE LEFT FOOT 7/5/2022 6:50 pm COMPARISON: None. HISTORY: ORDERING SYSTEM PROVIDED HISTORY: pain due to injury TECHNOLOGIST PROVIDED HISTORY: Reason for exam:->pain due to injury Reason for Exam: pain to left great toe with deformity FINDINGS: Acute dorsal dislocation interphalangeal joint of the hallux without definite associated fracture demonstrated. The other osseous structures of the left foot appear intact and are aligned normally. Joint spaces appear well maintained. Soft tissues are unremarkable in appearance. No retained radiopaque foreign body. Acute dorsal dislocation interphalangeal joint of the hallux without definite associated fracture demonstrated. Follow-up imaging recommended following reduction. XR TOE LEFT (MIN 2 VIEWS)    Result Date: 7/5/2022  EXAMINATION: XRAY VIEWS OF THE LEFT TOE 7/5/2022 8:15 pm COMPARISON: 07/05/2022 7:07 p.m. HISTORY: ORDERING SYSTEM PROVIDED HISTORY: post reduction TECHNOLOGIST PROVIDED HISTORY: Reason for exam:->post reduction Reason for Exam: post reduction left toe FINDINGS: Interval reduction of previously seen hallux interphalangeal joint dislocation. Alignment appears anatomic. No acute displaced fracture. Joint spaces appear preserved. No radiopaque foreign bodies. Interval reduction of previously seen hallux interphalangeal joint dislocation. Procedure Note:  Digital Block  The risks and benefits of an digital block were explained to the patient. Gaby Ayala or their surrogate had an opportunity to ask questions, and the risks, benefits, and alternatives were discussed. Patient verbally consented to the procedure.   I mixed 1.5 mL of 2% lidocaine without epi and 1.5 mL of 0.5% bupivacaine. Using pt's proximal phalanx of the left great toe as landmark, I injected 1.0 mL of the mixture on each side of the digit (total 2.0 mL)  ESBL minimal. Complication none. PROCEDURE:  JOINT REDUCTION  The benefits, risks, and alternatives of great toe reduction were discussed with Albino Adler or their surrogate. An opportunity to ask questions was provided, and consent was given for the procedure. Once adequately anesthetized, the great toe was easily reduced using traction-countertraction. Following successful reduction, immobilization was performed and the extremity's neurovascular status was checked and it was intact. The patient tolerated the procedure without complications. ED COURSE   Patient received digital block for pain, with good relief. X-ray imaging consistent with dorsal dislocation of the IP joint of the left great toe. Reduction performed by myself without complication. A buddy tape bandage and postop shoe was applied to the foot by nursing staff. Patient remained neurovascularly intact status post application. Discharged home with recommendations for rest, ice and elevation as well as orthopedic follow-up for further evaluation more definitive care. Have discussed return precautions as well as recommendations for follow-up and patient is in agreement and comfortable at discharge. A discussion was had with Mr. Layton Rodas Regarding toe injury, ED findings and recommendations for follow-up. Risk management discussed and shared decision making had with patient and/or surrogate. All questions were answered. Patient will follow up with orthopedist within 1 week for further evaluation/treatment. Patient will return to ED for new/worsening symptoms.     Patient was informed to continue over-the-counter Tylenol and or Motrin as needed for pain    MDM  I estimate there is LOW risk for COMPARTMENT SYNDROME, DEEP VENOUS THROMBOSIS, SEPTIC ARTHRITIS, TENDON OR NEUROVASCULAR INJURY, thus I consider the discharge disposition reasonable. Andrzej [place] and I have discussed the diagnosis and risks, and we agree with discharging home to follow-up with their primary doctor or the referral orthopedist. We also discussed returning to the Emergency Department immediately if new or worsening symptoms occur. We have discussed the symptoms which are most concerning (e.g., changing or worsening pain, numbness, weakness) that necessitate immediate return. Final Impression  1. Toe dislocation, left, initial encounter      Blood pressure 129/75, pulse 80, temperature 98 °F (36.7 °C), temperature source Oral, resp. rate 16, weight 165 lb (74.8 kg), SpO2 100 %. DISPOSITION  Patient was discharged to home in good condition.             Rachel Martinez, [address]  07/05/22 0829

## 2022-07-12 ENCOUNTER — TELEPHONE (OUTPATIENT)
Dept: ORTHOPEDIC SURGERY | Age: 27
End: 2022-07-12

## 2022-07-12 ENCOUNTER — OFFICE VISIT (OUTPATIENT)
Dept: ORTHOPEDIC SURGERY | Age: 27
End: 2022-07-12
Payer: COMMERCIAL

## 2022-07-12 VITALS — BODY MASS INDEX: 24.25 KG/M2 | HEIGHT: 68 IN | WEIGHT: 160 LBS

## 2022-07-12 DIAGNOSIS — S90.112A CONTUSION OF LEFT GREAT TOE WITHOUT DAMAGE TO NAIL, INITIAL ENCOUNTER: ICD-10-CM

## 2022-07-12 DIAGNOSIS — S93.105A CLOSED DISLOCATION OF GREAT TOE OF LEFT FOOT, INITIAL ENCOUNTER: Primary | ICD-10-CM

## 2022-07-12 PROCEDURE — 1036F TOBACCO NON-USER: CPT | Performed by: FAMILY MEDICINE

## 2022-07-12 PROCEDURE — G8427 DOCREV CUR MEDS BY ELIG CLIN: HCPCS | Performed by: FAMILY MEDICINE

## 2022-07-12 PROCEDURE — 99214 OFFICE O/P EST MOD 30 MIN: CPT | Performed by: FAMILY MEDICINE

## 2022-07-12 PROCEDURE — G8420 CALC BMI NORM PARAMETERS: HCPCS | Performed by: FAMILY MEDICINE

## 2022-07-12 RX ORDER — MELOXICAM 15 MG/1
15 TABLET ORAL DAILY
Qty: 30 TABLET | Refills: 3 | Status: SHIPPED | OUTPATIENT
Start: 2022-07-12

## 2022-07-12 NOTE — TELEPHONE ENCOUNTER
General Question     Subject: PATIENT GOT THE OKAY FROM HIS JOB TO LET HIM WEAR THE OPEN TOED BOOT FOR L FOOT TO WORK, IS QUESTIONING IF HE IS ABLE TO COME AND PICK ONE UP FROM THE OFFICE TOMORROW.  PLEASE ADVISE   Patient: Alta Gonzalez  Contact Number: [phone]

## 2022-07-12 NOTE — PROGRESS NOTES
Chief Complaint  Foot Pain (L Great Toe Pain)    Initial consultation left great toe pain status post contusion and subsequent IP dislocation left great toe 7/8/2022    History of Present Illness:  More Matamoros is a 27 y.o. 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 Dates that while working out on 7/8/2022 he was doing a 4-1/2 foot box jump when the box teetered causing him to fall in an awkward position as he was twisting came down onto his left forefoot sustaining an injury to his left great toe. He did feel a pop at the time of the injury did have immediate pain with reluctance to bear weight on the forefoot and upon removing his shoe and sock he did have obvious deformity suggestive of a left great toe IP joint dislocation which was reclosed located in the emergency room at [facility]. He has been icing and taking anti-inflammatories and utilizing a postop shoe however this is difficult because he needs to have covering of his foot with working at [employer]. He states his overall pain symptoms are doing reasonably well and has been taking ibuprofen and subtherapeutic dosings. He does have more soreness and stiffness as opposed to actual pain but is avoiding toeing off. He has no previous history of foot injury and is being seen today for orthopedic and sports consultation with review of his imaging. Medical History  Patient's medications, allergies, past medical, surgical, social and family histories were reviewed and updated as appropriate.     Review of Systems  Pertinent items are noted in HPI  Review of systems reviewed from Patient History Form dated on 7/12/2022 and available in the patient's chart under the Media tab. Vital Signs  There were no vitals filed for this visit. General Exam:     Constitutional: Patient is adequately groomed with no evidence of malnutrition  DTRs: Deep tendon reflexes are intact  Mental Status: The patient is oriented to time, place and person. The patient's mood and affect are appropriate. Lymphatic: The lymphatic examination bilaterally reveals all areas to be without enlargement or induration. Vascular: Examination reveals no swelling or calf tenderness. Peripheral pulses are palpable and 2+. Neurological: The patient has good coordination. There is no weakness or sensory deficit. Foot  Examination  Inspection: There is no current episode of dislocation or high-grade deformity although he does have mild soft tissue swelling over the left great toe IP joint with some resolving ecchymosis. Palpation: Does have soreness and stiffness for collateral ligaments and capsule to the PIP joint of his left great toe. Rang of Motion: 50% reduction in IP and first MTP motion involving his left great toe. Strength: Flexor and extensor tendon function appears to be well preserved without evidence of plantar plate discomfort. Special Tests: No evidence of high-grade instability. Skin: There are no rashes, ulcerations or lesions. Distal motor sensorivascular appears intact. Gait: Moderate antalgia. Reflex symmetrically preserved. Additional Comments:     Additional Examinations:  Contralateral Exam: Contralateral right forefoot and great toe exam is benign  Right Lower Extremity: Examination of the right lower extremity does not show any tenderness, deformity or injury. Range of motion is unremarkable. There is no gross instability. There are no rashes, ulcerations or lesions. Strength and tone are normal.  Left Lower Extremity: Examination of the left lower extremity does not show any tenderness, deformity or injury. Range of motion is unremarkable. There is no gross instability. There are no rashes, ulcerations or lesions. Strength and tone are normal.      Diagnostic Test Findings: Right foot x-rays at [facility] 7/8/2022 does show a dorsal dislocation to the IP joint of his left great toe with subsequent films showing adequate relocation        Assessment : #1.  6 days status post left great toe contusion with left great toe IP joint dorsal dislocation with capsular sprain    Impression:  Encounter Diagnoses   Name Primary?  Closed dislocation of great toe of left foot, initial encounter Yes    Contusion of left great toe without damage to nail, initial encounter        Office Procedures:  No orders of the defined types were placed in this encounter. Treatment Plan: Treatment options were discussed with Bud aRy in the office today. He is now 6 days out from a left great toe contusion with subsequent dorsal dislocation at the IP joint which did undergo successful closed reduction at the [facility] location. After discussing options, I still think we need to protect this for least several more weeks. We did start him on meloxicam and because his work does require to covering of his toe we will change him to a low tide walking boot. Icing and activity modification and avoidance of working out lower extremity and impact activities were discussed. He will clear use of his low tide boot with his employer. Icing and activity modification was discussed. We will see him back in 3 weeks for repeat toe films to see if we can put him in appropriate footwear and we may consider Lynco arches with metatarsal barring initial course of therapy. He will contact us in the interim with questions or concerns        This dictation was performed with a verbal recognition program (DRAGON) and it was checked for errors. It is possible that there are still dictated errors within this office note. If so, please bring any errors to my attention for an addendum. All efforts were made to ensure that this office note is accurate.

## 2022-07-12 NOTE — TELEPHONE ENCOUNTER
CALLED PATIENT AND INFORMED THEM WE WILL BE AT THE [facility] OFFICE TOMORROW AND HE CAN SWING BY AT ANY POINT AND  THE BOOT FROM US.

## 2022-07-12 NOTE — LETTER
[facility] [address] [place] [address]  [address]  Phone: [phone]  Fax: [phone]    Sruthi Thornton MD        July 12, 2022     Patient: Carlos Wilcox   YOB: 1995   Date of Visit: 7/12/2022       To Whom It May Concern: It is my medical opinion that Marlene Paul should remain out of work for the next 3 weeks. Will follow up and anticipate transition back to a shoe from his post-op shoe at that point. If you have any questions or concerns, please don't hesitate to call.     Sincerely,      Sruthi Thornton MD

## 2022-07-13 DIAGNOSIS — S90.112A CONTUSION OF LEFT GREAT TOE WITHOUT DAMAGE TO NAIL, INITIAL ENCOUNTER: ICD-10-CM

## 2022-07-13 DIAGNOSIS — S93.105A CLOSED DISLOCATION OF GREAT TOE OF LEFT FOOT, INITIAL ENCOUNTER: Primary | ICD-10-CM

## 2022-07-13 PROCEDURE — L4361 PNEUMA/VAC WALK BOOT PRE OTS: HCPCS | Performed by: FAMILY MEDICINE

## 2022-09-15 ENCOUNTER — OFFICE VISIT (OUTPATIENT)
Dept: ORTHOPEDIC SURGERY | Age: 27
End: 2022-09-15
Payer: COMMERCIAL

## 2022-09-15 VITALS — HEIGHT: 68 IN | BODY MASS INDEX: 23.49 KG/M2 | WEIGHT: 155 LBS

## 2022-09-15 DIAGNOSIS — S46.311A STRAIN OF RIGHT TRICEPS, INITIAL ENCOUNTER: ICD-10-CM

## 2022-09-15 DIAGNOSIS — S93.112A: ICD-10-CM

## 2022-09-15 DIAGNOSIS — M79.675 TOE PAIN, LEFT: Primary | ICD-10-CM

## 2022-09-15 PROCEDURE — 4004F PT TOBACCO SCREEN RCVD TLK: CPT | Performed by: FAMILY MEDICINE

## 2022-09-15 PROCEDURE — 99214 OFFICE O/P EST MOD 30 MIN: CPT | Performed by: FAMILY MEDICINE

## 2022-09-15 PROCEDURE — G8420 CALC BMI NORM PARAMETERS: HCPCS | Performed by: FAMILY MEDICINE

## 2022-09-15 PROCEDURE — G8427 DOCREV CUR MEDS BY ELIG CLIN: HCPCS | Performed by: FAMILY MEDICINE

## 2022-09-15 RX ORDER — MELOXICAM 15 MG/1
TABLET ORAL
COMMUNITY
Start: 2022-07-12

## 2022-09-15 NOTE — PROGRESS NOTES
Chief Complaint    Arm Pain (N RIGHT TRICEPS/)      History of Present Illness:  Libby Harley is a 24 y.o. male who does undergo resistance training several days per week and works at [employer]. He is a very nice patient of Dr. Segundo Timmons whom we are very familiar with. Who presents today with right arm pain at the proximal triceps. He is also following up on prior left great toe IP joint dislocation from July. He did not follow-up with us regarding his toe injury but was able to wean himself out of the postop shoe and does have stiffness as he has not had t to do rehabilitation. With regard to his right triceps, he had started 2 weeks ago when he was lifting weights, he reports he was performing light weight tricep exercises to muscle failure. He A few days after endorses pain with reaching and overhead flexion with some tingling along belly of the triceps. Tried some ibuprofen and ice which provided some relief. Today he reports 3/10 pain and is 75% better from onset. Pain Assessment  Location of Pain: Arm  Location Modifiers: Right  Severity of Pain: 4  Quality of Pain: Other (Comment), Sharp (decreased sensation/tingling)  Duration of Pain: A few minutes  Frequency of Pain: Intermittent  Aggravating Factors: Straightening, Stretching, Bending  Limiting Behavior: Yes  Relieving Factors: Rest  Result of Injury: Yes  Work-Related Injury: No  Are there other pain locations you wish to document?: No    Medical History  Patient's medications, allergies, past medical, surgical, social and family histories were reviewed and updated as appropriate. Review of Systems  Relevant review of systems reviewed from 9/15/2022 and available in the patient's chart under the medial tab.        Vital Signs  Ht 5' 8\" (1.727 m)   Wt 155 lb (70.3 kg)   BMI 23.57 kg/m²         General Exam:   Constitutional: Patient is adequately groomed with no evidence of malnutrition  DTRs: Deep tendon reflexes are Specialty:   Physical Therapist     Number of Visits Requested:   1       Treatment Plan:  Options were discussed with Efraín Naylor today. We did review his updated plain films to his foot which shows adequate positioning of his right great toe IP joint. He does have considerable stiffness and is now 2 months out from his unusual IP joint dislocation to his right great toe. Thankfully most of his pain has resolved but does have stiffness and we will see how he does with a few sessions of therapy. I think we can hold off on dynamic bracing and splinting at this time but we will see if we can instruct him on proper rehabilitation to reestablish at least part of his motion involving his toe. He is also now 2 weeks out from an overuse triceps training which is already 75% improved. Home exercises will be recommended as well as modification of his exercise program.  He may take his ibuprofen 800 mg 1 pill 3 times daily modification of his workouts was discussed. We will see him back in 4 to 6 weeks for follow-up. He will contact us in the interim with questions or concerns. This dictation was performed with a verbal recognition program (DRAGON) and it was checked for errors. It is possible that there are still dictated errors within this office note. If so, please bring any errors to my attention for an addendum. All efforts were made to ensure that this office note is accurate.

## 2022-09-15 NOTE — PROGRESS NOTES
Chief Complaint  Arm Pain (N RIGHT TRICEPS/)      History of Present Illness:  Rivka Blanchard is a 24 y.o. male          Medical History  Patient's medications, allergies, past medical, surgical, social and family histories were reviewed and updated as appropriate. Review of Systems  Relevant review of systems reviewed and available in the patient's chart    Vital Signs  There were no vitals taken for this visit. General Exam:    Upper Arm Examination  Inspection: .***    Palpation: ***     Rang of Motion: ***    Strength: ***    Special Tests: ***    Skin: There are no rashes, ulcerations or lesions. Gait: ***    Additional Comments:    Additional Examinations:      Diagnostic Test Findings:    Assessment:    Impression:  No diagnosis found. Office Procedures:  No orders of the defined types were placed in this encounter.       Treatment Plan:  ***

## 2022-10-03 ENCOUNTER — HOSPITAL ENCOUNTER (OUTPATIENT)
Dept: PHYSICAL THERAPY | Age: 27
Setting detail: THERAPIES SERIES
Discharge: HOME | End: 2022-10-03

## 2022-10-03 NOTE — FLOWSHEET NOTE
[facility], [address], [address] [place] [address]        Physical Therapy  Cancellation/No-show Note  Patient Name:  Leroy Peñaloza  :  1995   Date:  10/3/2022  Cancelled visits to date: 0  No-shows to date: 1    For today's appointment patient:  []  Cancelled  []  Rescheduled appointment  [x]  No-show     Reason given by patient:  []  Patient ill  []  Conflicting appointment  []  No transportation    []  Conflict with work  []  No reason given  []  Other:     Comments:      Electronically signed by:  Rocael Woods, PT